# Patient Record
Sex: FEMALE | Race: WHITE | NOT HISPANIC OR LATINO | URBAN - METROPOLITAN AREA
[De-identification: names, ages, dates, MRNs, and addresses within clinical notes are randomized per-mention and may not be internally consistent; named-entity substitution may affect disease eponyms.]

---

## 2018-12-05 ENCOUNTER — APPOINTMENT (OUTPATIENT)
Dept: WOMENS IMAGING | Facility: HOSPITAL | Age: 48
End: 2018-12-05

## 2018-12-05 PROCEDURE — 77067 SCR MAMMO BI INCL CAD: CPT | Performed by: RADIOLOGY

## 2024-03-29 ENCOUNTER — APPOINTMENT (OUTPATIENT)
Dept: CARDIOLOGY | Facility: HOSPITAL | Age: 54
End: 2024-03-29
Payer: COMMERCIAL

## 2024-03-29 ENCOUNTER — APPOINTMENT (OUTPATIENT)
Dept: GENERAL RADIOLOGY | Facility: HOSPITAL | Age: 54
End: 2024-03-29
Payer: COMMERCIAL

## 2024-03-29 ENCOUNTER — HOSPITAL ENCOUNTER (OUTPATIENT)
Facility: HOSPITAL | Age: 54
Setting detail: OBSERVATION
Discharge: HOME | End: 2024-03-29
Attending: EMERGENCY MEDICINE | Admitting: HOSPITALIST
Payer: COMMERCIAL

## 2024-03-29 VITALS
RESPIRATION RATE: 17 BRPM | DIASTOLIC BLOOD PRESSURE: 71 MMHG | HEIGHT: 60 IN | HEART RATE: 64 BPM | OXYGEN SATURATION: 98 % | TEMPERATURE: 98.6 F | WEIGHT: 160 LBS | BODY MASS INDEX: 31.41 KG/M2 | SYSTOLIC BLOOD PRESSURE: 103 MMHG

## 2024-03-29 DIAGNOSIS — I47.10 SVT (SUPRAVENTRICULAR TACHYCARDIA): Primary | ICD-10-CM

## 2024-03-29 DIAGNOSIS — R79.89 ELEVATED TROPONIN: ICD-10-CM

## 2024-03-29 DIAGNOSIS — R07.9 CHEST PAIN, UNSPECIFIED TYPE: ICD-10-CM

## 2024-03-29 PROBLEM — I24.89 DEMAND ISCHEMIA: Status: ACTIVE | Noted: 2024-03-29

## 2024-03-29 PROBLEM — L97.509: Status: ACTIVE | Noted: 2024-03-29

## 2024-03-29 LAB
ALBUMIN SERPL-MCNC: 3.9 G/DL (ref 3.5–5.2)
ALBUMIN/GLOB SERPL: 1.7 G/DL
ALP SERPL-CCNC: 80 U/L (ref 39–117)
ALT SERPL W P-5'-P-CCNC: 38 U/L (ref 1–33)
ANION GAP SERPL CALCULATED.3IONS-SCNC: 11.8 MMOL/L (ref 5–15)
ANION GAP SERPL CALCULATED.3IONS-SCNC: 12 MMOL/L (ref 5–15)
AORTIC ARCH: 2.5 CM
AORTIC DIMENSIONLESS INDEX: 0.6 (DI)
ASCENDING AORTA: 2.8 CM
AST SERPL-CCNC: 40 U/L (ref 1–32)
BASOPHILS # BLD AUTO: 0.03 10*3/MM3 (ref 0–0.2)
BASOPHILS NFR BLD AUTO: 0.5 % (ref 0–1.5)
BH CV ECHO MEAS - ACS: 1.95 CM
BH CV ECHO MEAS - AO MAX PG: 5.3 MMHG
BH CV ECHO MEAS - AO MEAN PG: 3.1 MMHG
BH CV ECHO MEAS - AO ROOT DIAM: 2.7 CM
BH CV ECHO MEAS - AO V2 MAX: 115.4 CM/SEC
BH CV ECHO MEAS - AO V2 VTI: 25.9 CM
BH CV ECHO MEAS - AVA(I,D): 1.65 CM2
BH CV ECHO MEAS - EDV(CUBED): 76.8 ML
BH CV ECHO MEAS - EDV(MOD-SP2): 60 ML
BH CV ECHO MEAS - EDV(MOD-SP4): 58 ML
BH CV ECHO MEAS - EF(MOD-BP): 57.3 %
BH CV ECHO MEAS - EF(MOD-SP2): 58.3 %
BH CV ECHO MEAS - EF(MOD-SP4): 56.9 %
BH CV ECHO MEAS - ESV(CUBED): 26.5 ML
BH CV ECHO MEAS - ESV(MOD-SP2): 25 ML
BH CV ECHO MEAS - ESV(MOD-SP4): 25 ML
BH CV ECHO MEAS - FS: 29.9 %
BH CV ECHO MEAS - IVS/LVPW: 0.96 CM
BH CV ECHO MEAS - IVSD: 0.91 CM
BH CV ECHO MEAS - LAT PEAK E' VEL: 13.1 CM/SEC
BH CV ECHO MEAS - LV MASS(C)D: 126.2 GRAMS
BH CV ECHO MEAS - LV MAX PG: 2.9 MMHG
BH CV ECHO MEAS - LV MEAN PG: 1.53 MMHG
BH CV ECHO MEAS - LV V1 MAX: 85.1 CM/SEC
BH CV ECHO MEAS - LV V1 VTI: 16.9 CM
BH CV ECHO MEAS - LVIDD: 4.3 CM
BH CV ECHO MEAS - LVIDS: 3 CM
BH CV ECHO MEAS - LVOT AREA: 2.5 CM2
BH CV ECHO MEAS - LVOT DIAM: 1.79 CM
BH CV ECHO MEAS - LVPWD: 0.94 CM
BH CV ECHO MEAS - MED PEAK E' VEL: 9.2 CM/SEC
BH CV ECHO MEAS - MV A DUR: 0.1 SEC
BH CV ECHO MEAS - MV A MAX VEL: 37.9 CM/SEC
BH CV ECHO MEAS - MV DEC SLOPE: 257 CM/SEC2
BH CV ECHO MEAS - MV DEC TIME: 0.19 SEC
BH CV ECHO MEAS - MV E MAX VEL: 83.9 CM/SEC
BH CV ECHO MEAS - MV E/A: 2.21
BH CV ECHO MEAS - MV MAX PG: 2.23 MMHG
BH CV ECHO MEAS - MV MEAN PG: 1.07 MMHG
BH CV ECHO MEAS - MV P1/2T: 90.5 MSEC
BH CV ECHO MEAS - MV V2 VTI: 24.3 CM
BH CV ECHO MEAS - MVA(P1/2T): 2.43 CM2
BH CV ECHO MEAS - MVA(VTI): 1.75 CM2
BH CV ECHO MEAS - PA ACC TIME: 0.12 SEC
BH CV ECHO MEAS - PA V2 MAX: 80.8 CM/SEC
BH CV ECHO MEAS - PULM A REVS DUR: 0.09 SEC
BH CV ECHO MEAS - PULM A REVS VEL: 27.3 CM/SEC
BH CV ECHO MEAS - PULM DIAS VEL: 44.3 CM/SEC
BH CV ECHO MEAS - PULM S/D: 0.89
BH CV ECHO MEAS - PULM SYS VEL: 39.4 CM/SEC
BH CV ECHO MEAS - RAP SYSTOLE: 3 MMHG
BH CV ECHO MEAS - RV MAX PG: 1.13 MMHG
BH CV ECHO MEAS - RV V1 MAX: 53.3 CM/SEC
BH CV ECHO MEAS - RV V1 VTI: 14.5 CM
BH CV ECHO MEAS - RVSP: 19.5 MMHG
BH CV ECHO MEAS - SUP REN AO DIAM: 2.1 CM
BH CV ECHO MEAS - SV(LVOT): 42.6 ML
BH CV ECHO MEAS - SV(MOD-SP2): 35 ML
BH CV ECHO MEAS - SV(MOD-SP4): 33 ML
BH CV ECHO MEAS - TAPSE (>1.6): 1.59 CM
BH CV ECHO MEAS - TR MAX PG: 16.5 MMHG
BH CV ECHO MEAS - TR MAX VEL: 203.1 CM/SEC
BH CV ECHO MEASUREMENTS AVERAGE E/E' RATIO: 7.52
BH CV XLRA - RV BASE: 3.8 CM
BH CV XLRA - RV LENGTH: 5.6 CM
BH CV XLRA - RV MID: 1.46 CM
BH CV XLRA - TDI S': 10.9 CM/SEC
BILIRUB SERPL-MCNC: 0.3 MG/DL (ref 0–1.2)
BUN SERPL-MCNC: 12 MG/DL (ref 6–20)
BUN SERPL-MCNC: 13 MG/DL (ref 6–20)
BUN/CREAT SERPL: 14.4 (ref 7–25)
BUN/CREAT SERPL: 15 (ref 7–25)
CALCIUM SPEC-SCNC: 8.1 MG/DL (ref 8.6–10.5)
CALCIUM SPEC-SCNC: 8.4 MG/DL (ref 8.6–10.5)
CHLORIDE SERPL-SCNC: 107 MMOL/L (ref 98–107)
CHLORIDE SERPL-SCNC: 112 MMOL/L (ref 98–107)
CO2 SERPL-SCNC: 21 MMOL/L (ref 22–29)
CO2 SERPL-SCNC: 22.2 MMOL/L (ref 22–29)
CREAT SERPL-MCNC: 0.8 MG/DL (ref 0.57–1)
CREAT SERPL-MCNC: 0.9 MG/DL (ref 0.57–1)
D DIMER PPP FEU-MCNC: <0.27 MCGFEU/ML (ref 0–0.53)
DEPRECATED RDW RBC AUTO: 42.4 FL (ref 37–54)
DEPRECATED RDW RBC AUTO: 44.8 FL (ref 37–54)
EGFRCR SERPLBLD CKD-EPI 2021: 76.6 ML/MIN/1.73
EGFRCR SERPLBLD CKD-EPI 2021: 88.2 ML/MIN/1.73
EOSINOPHIL # BLD AUTO: 0.14 10*3/MM3 (ref 0–0.4)
EOSINOPHIL NFR BLD AUTO: 2.2 % (ref 0.3–6.2)
ERYTHROCYTE [DISTWIDTH] IN BLOOD BY AUTOMATED COUNT: 12.3 % (ref 12.3–15.4)
ERYTHROCYTE [DISTWIDTH] IN BLOOD BY AUTOMATED COUNT: 12.6 % (ref 12.3–15.4)
GEN 5 2HR TROPONIN T REFLEX: 65 NG/L
GLOBULIN UR ELPH-MCNC: 2.3 GM/DL
GLUCOSE SERPL-MCNC: 134 MG/DL (ref 65–99)
GLUCOSE SERPL-MCNC: 91 MG/DL (ref 65–99)
HCT VFR BLD AUTO: 40.1 % (ref 34–46.6)
HCT VFR BLD AUTO: 40.2 % (ref 34–46.6)
HGB BLD-MCNC: 13.2 G/DL (ref 12–15.9)
HGB BLD-MCNC: 13.4 G/DL (ref 12–15.9)
IMM GRANULOCYTES # BLD AUTO: 0.01 10*3/MM3 (ref 0–0.05)
IMM GRANULOCYTES NFR BLD AUTO: 0.2 % (ref 0–0.5)
LEFT ATRIUM VOLUME INDEX: 18.5 ML/M2
LYMPHOCYTES # BLD AUTO: 2.9 10*3/MM3 (ref 0.7–3.1)
LYMPHOCYTES NFR BLD AUTO: 45.4 % (ref 19.6–45.3)
MAGNESIUM SERPL-MCNC: 1.9 MG/DL (ref 1.6–2.6)
MCH RBC QN AUTO: 31.6 PG (ref 26.6–33)
MCH RBC QN AUTO: 31.7 PG (ref 26.6–33)
MCHC RBC AUTO-ENTMCNC: 32.8 G/DL (ref 31.5–35.7)
MCHC RBC AUTO-ENTMCNC: 33.4 G/DL (ref 31.5–35.7)
MCV RBC AUTO: 94.8 FL (ref 79–97)
MCV RBC AUTO: 96.2 FL (ref 79–97)
MONOCYTES # BLD AUTO: 0.3 10*3/MM3 (ref 0.1–0.9)
MONOCYTES NFR BLD AUTO: 4.7 % (ref 5–12)
NEUTROPHILS NFR BLD AUTO: 3.01 10*3/MM3 (ref 1.7–7)
NEUTROPHILS NFR BLD AUTO: 47 % (ref 42.7–76)
NRBC BLD AUTO-RTO: 0 /100 WBC (ref 0–0.2)
PLATELET # BLD AUTO: 207 10*3/MM3 (ref 140–450)
PLATELET # BLD AUTO: 223 10*3/MM3 (ref 140–450)
PMV BLD AUTO: 10.1 FL (ref 6–12)
PMV BLD AUTO: 10.2 FL (ref 6–12)
POTASSIUM SERPL-SCNC: 3.9 MMOL/L (ref 3.5–5.2)
POTASSIUM SERPL-SCNC: 3.9 MMOL/L (ref 3.5–5.2)
PROT SERPL-MCNC: 6.2 G/DL (ref 6–8.5)
QT INTERVAL: 376 MS
QT INTERVAL: 402 MS
QTC INTERVAL: 434 MS
QTC INTERVAL: 461 MS
RBC # BLD AUTO: 4.18 10*6/MM3 (ref 3.77–5.28)
RBC # BLD AUTO: 4.23 10*6/MM3 (ref 3.77–5.28)
SINUS: 2.6 CM
SODIUM SERPL-SCNC: 141 MMOL/L (ref 136–145)
SODIUM SERPL-SCNC: 145 MMOL/L (ref 136–145)
STJ: 2.47 CM
TROPONIN T DELTA: 55 NG/L
TROPONIN T SERPL HS-MCNC: 10 NG/L
TROPONIN T SERPL HS-MCNC: 123 NG/L
TSH SERPL DL<=0.05 MIU/L-ACNC: 2.3 UIU/ML (ref 0.27–4.2)
WBC NRBC COR # BLD AUTO: 6.39 10*3/MM3 (ref 3.4–10.8)
WBC NRBC COR # BLD AUTO: 6.6 10*3/MM3 (ref 3.4–10.8)

## 2024-03-29 PROCEDURE — 99285 EMERGENCY DEPT VISIT HI MDM: CPT

## 2024-03-29 PROCEDURE — 93010 ELECTROCARDIOGRAM REPORT: CPT | Performed by: INTERNAL MEDICINE

## 2024-03-29 PROCEDURE — 85027 COMPLETE CBC AUTOMATED: CPT | Performed by: NURSE PRACTITIONER

## 2024-03-29 PROCEDURE — 85379 FIBRIN DEGRADATION QUANT: CPT | Performed by: EMERGENCY MEDICINE

## 2024-03-29 PROCEDURE — 36415 COLL VENOUS BLD VENIPUNCTURE: CPT | Performed by: NURSE PRACTITIONER

## 2024-03-29 PROCEDURE — 93005 ELECTROCARDIOGRAM TRACING: CPT | Performed by: PHYSICIAN ASSISTANT

## 2024-03-29 PROCEDURE — 93005 ELECTROCARDIOGRAM TRACING: CPT | Performed by: EMERGENCY MEDICINE

## 2024-03-29 PROCEDURE — 80053 COMPREHEN METABOLIC PANEL: CPT | Performed by: EMERGENCY MEDICINE

## 2024-03-29 PROCEDURE — 85025 COMPLETE CBC W/AUTO DIFF WBC: CPT | Performed by: EMERGENCY MEDICINE

## 2024-03-29 PROCEDURE — 71045 X-RAY EXAM CHEST 1 VIEW: CPT

## 2024-03-29 PROCEDURE — G0378 HOSPITAL OBSERVATION PER HR: HCPCS

## 2024-03-29 PROCEDURE — 84484 ASSAY OF TROPONIN QUANT: CPT | Performed by: EMERGENCY MEDICINE

## 2024-03-29 PROCEDURE — 99204 OFFICE O/P NEW MOD 45 MIN: CPT | Performed by: INTERNAL MEDICINE

## 2024-03-29 PROCEDURE — 93306 TTE W/DOPPLER COMPLETE: CPT | Performed by: INTERNAL MEDICINE

## 2024-03-29 PROCEDURE — 84484 ASSAY OF TROPONIN QUANT: CPT | Performed by: NURSE PRACTITIONER

## 2024-03-29 PROCEDURE — 84443 ASSAY THYROID STIM HORMONE: CPT | Performed by: EMERGENCY MEDICINE

## 2024-03-29 PROCEDURE — 83735 ASSAY OF MAGNESIUM: CPT | Performed by: EMERGENCY MEDICINE

## 2024-03-29 PROCEDURE — 93306 TTE W/DOPPLER COMPLETE: CPT

## 2024-03-29 RX ORDER — ACETAMINOPHEN 160 MG/5ML
650 SOLUTION ORAL EVERY 4 HOURS PRN
Status: DISCONTINUED | OUTPATIENT
Start: 2024-03-29 | End: 2024-03-29 | Stop reason: HOSPADM

## 2024-03-29 RX ORDER — AMOXICILLIN 250 MG
2 CAPSULE ORAL 2 TIMES DAILY PRN
Status: DISCONTINUED | OUTPATIENT
Start: 2024-03-29 | End: 2024-03-29 | Stop reason: HOSPADM

## 2024-03-29 RX ORDER — ACETAMINOPHEN 650 MG/1
650 SUPPOSITORY RECTAL EVERY 4 HOURS PRN
Status: DISCONTINUED | OUTPATIENT
Start: 2024-03-29 | End: 2024-03-29 | Stop reason: HOSPADM

## 2024-03-29 RX ORDER — SODIUM CHLORIDE 0.9 % (FLUSH) 0.9 %
10 SYRINGE (ML) INJECTION AS NEEDED
Status: DISCONTINUED | OUTPATIENT
Start: 2024-03-29 | End: 2024-03-29 | Stop reason: HOSPADM

## 2024-03-29 RX ORDER — SODIUM CHLORIDE 9 MG/ML
40 INJECTION, SOLUTION INTRAVENOUS AS NEEDED
Status: DISCONTINUED | OUTPATIENT
Start: 2024-03-29 | End: 2024-03-29 | Stop reason: HOSPADM

## 2024-03-29 RX ORDER — POLYETHYLENE GLYCOL 3350 17 G/17G
17 POWDER, FOR SOLUTION ORAL DAILY PRN
Status: DISCONTINUED | OUTPATIENT
Start: 2024-03-29 | End: 2024-03-29 | Stop reason: HOSPADM

## 2024-03-29 RX ORDER — CALCIUM CARBONATE 500 MG/1
2 TABLET, CHEWABLE ORAL 2 TIMES DAILY PRN
Status: DISCONTINUED | OUTPATIENT
Start: 2024-03-29 | End: 2024-03-29 | Stop reason: HOSPADM

## 2024-03-29 RX ORDER — NITROGLYCERIN 0.4 MG/1
0.4 TABLET SUBLINGUAL
Status: DISCONTINUED | OUTPATIENT
Start: 2024-03-29 | End: 2024-03-29 | Stop reason: HOSPADM

## 2024-03-29 RX ORDER — ACETAMINOPHEN 325 MG/1
650 TABLET ORAL EVERY 4 HOURS PRN
Status: DISCONTINUED | OUTPATIENT
Start: 2024-03-29 | End: 2024-03-29 | Stop reason: HOSPADM

## 2024-03-29 RX ORDER — ATENOLOL 25 MG/1
25 TABLET ORAL
Qty: 30 TABLET | Refills: 1 | Status: SHIPPED | OUTPATIENT
Start: 2024-03-30

## 2024-03-29 RX ORDER — SODIUM CHLORIDE 0.9 % (FLUSH) 0.9 %
10 SYRINGE (ML) INJECTION EVERY 12 HOURS SCHEDULED
Status: DISCONTINUED | OUTPATIENT
Start: 2024-03-29 | End: 2024-03-29 | Stop reason: HOSPADM

## 2024-03-29 RX ORDER — ATENOLOL 25 MG/1
25 TABLET ORAL
Status: DISCONTINUED | OUTPATIENT
Start: 2024-03-29 | End: 2024-03-29 | Stop reason: HOSPADM

## 2024-03-29 RX ORDER — BISACODYL 5 MG/1
5 TABLET, DELAYED RELEASE ORAL DAILY PRN
Status: DISCONTINUED | OUTPATIENT
Start: 2024-03-29 | End: 2024-03-29 | Stop reason: HOSPADM

## 2024-03-29 RX ORDER — BISACODYL 10 MG
10 SUPPOSITORY, RECTAL RECTAL DAILY PRN
Status: DISCONTINUED | OUTPATIENT
Start: 2024-03-29 | End: 2024-03-29 | Stop reason: HOSPADM

## 2024-03-29 RX ADMIN — Medication 10 ML: at 08:57

## 2024-03-29 RX ADMIN — ATENOLOL 25 MG: 25 TABLET ORAL at 10:56

## 2024-03-29 NOTE — H&P
Patient Name:  Eleni Dahl  YOB: 1970  MRN:  [id number]  Admit Date:  3/29/2024  Patient Care Team:  Provider, No Known as PCP - General      Subjective   History Present Illness     Chief Complaint   Patient presents with    Chest Pain    Abdominal Pain       Ms. Dahl is a 54 y.o. female who is generally healthy who presented to the hospital last night with acute onset of chest pain and palpitations.  She had just gone to bed about an hour prior and developed the symptoms suddenly.  She called EMS and was noted to be in SVT with narrow complex tachycardia rates over 260.  She was actually cardioverted en route.  She has not any recurrent symptoms since getting here.  She reports a past history of the same thing occurring about 10 years ago and apparently was cardioverted quickly then as well.  She has not been on any cardiac medications since that time and has not had any known recurrence.  She has otherwise been in her normal state of health recently.  She denies any changes to her diet or excessive caffeine intake.    Review of Systems   Constitutional:  Negative for chills, fatigue and fever.   HENT:  Negative for congestion and trouble swallowing.    Eyes:  Negative for visual disturbance.   Respiratory:  Negative for cough, chest tightness and shortness of breath.    Cardiovascular:  Negative for chest pain, palpitations and leg swelling.   Gastrointestinal:  Negative for abdominal distention, abdominal pain, constipation, diarrhea, nausea and vomiting.   Genitourinary:  Negative for difficulty urinating, dysuria and frequency.   Musculoskeletal:  Negative for arthralgias.   Skin:  Negative for rash.   Neurological:  Negative for dizziness and headaches.   Psychiatric/Behavioral:  Negative for confusion.         Personal History     History reviewed. No pertinent past medical history.  History reviewed. No pertinent surgical history.  History reviewed. No pertinent family  history.  Social History     Tobacco Use    Smoking status: Never    Smokeless tobacco: Never   Vaping Use    Vaping status: Never Used   Substance Use Topics    Alcohol use: Never    Drug use: Never     No current facility-administered medications on file prior to encounter.     No current outpatient medications on file prior to encounter.     Allergies   Allergen Reactions    Wound Dressing Adhesive Rash       Objective    Objective     Vital Signs  Temp:  [98 °F (36.7 °C)-98.6 °F (37 °C)] 98.6 °F (37 °C)  Heart Rate:  [69-83] 69  Resp:  [16-20] 17  BP: (102-128)/(73-83) 119/73  SpO2:  [97 %-100 %] 97 %  on   ;   Device (Oxygen Therapy): room air  Body mass index is 31.25 kg/m².    Physical Exam  Vitals reviewed.   Constitutional:       General: She is not in acute distress.  HENT:      Head: Normocephalic and atraumatic.   Eyes:      General: No scleral icterus.  Neck:      Vascular: No JVD.   Cardiovascular:      Rate and Rhythm: Normal rate and regular rhythm.      Pulses: Normal pulses.      Heart sounds: Normal heart sounds. No murmur heard.  Pulmonary:      Effort: Pulmonary effort is normal. No respiratory distress.      Breath sounds: Normal breath sounds.   Abdominal:      General: There is no distension.      Palpations: Abdomen is soft.      Tenderness: There is no abdominal tenderness.   Musculoskeletal:         General: No swelling or tenderness.      Cervical back: Neck supple.   Skin:     General: Skin is warm and dry.      Coloration: Skin is not jaundiced.      Findings: No rash.   Neurological:      Mental Status: She is alert and oriented to person, place, and time.   Psychiatric:         Mood and Affect: Mood normal.         Behavior: Behavior normal.         Results Review:  I reviewed the patient's new clinical results.  I reviewed the patient's new imaging results and agree with the interpretation.  I reviewed the patient's other test results and agree with the interpretation  I personally  viewed and interpreted the patient's EKG/Telemetry data      Lab Results (last 24 hours)       Procedure Component Value Units Date/Time    CBC & Differential [[id number]]  (Abnormal) Collected: 03/29/24 0124    Specimen: Blood Updated: 03/29/24 0132    Narrative:      The following orders were created for panel order CBC & Differential.  Procedure                               Abnormality         Status                     ---------                               -----------         ------                     CBC Auto Differential[[id number]]        Abnormal            Final result                 Please view results for these tests on the individual orders.    Comprehensive Metabolic Panel [[id number]]  (Abnormal) Collected: 03/29/24 0124    Specimen: Blood Updated: 03/29/24 0148     Glucose 134 mg/dL      BUN 13 mg/dL      Creatinine 0.90 mg/dL      Sodium 145 mmol/L      Potassium 3.9 mmol/L      Comment: Specimen hemolyzed.  Result may be falsely elevated.        Chloride 112 mmol/L      CO2 21.0 mmol/L      Calcium 8.1 mg/dL      Total Protein 6.2 g/dL      Albumin 3.9 g/dL      ALT (SGPT) 38 U/L      Comment: Specimen hemolyzed.  Result may  be falsely elevated.        AST (SGOT) 40 U/L      Comment: Specimen hemolyzed.  Result may be falsely elevated.        Alkaline Phosphatase 80 U/L      Total Bilirubin 0.3 mg/dL      Globulin 2.3 gm/dL      A/G Ratio 1.7 g/dL      BUN/Creatinine Ratio 14.4     Anion Gap 12.0 mmol/L      eGFR 76.6 mL/min/1.73     Narrative:      GFR Normal >60  Chronic Kidney Disease <60  Kidney Failure <15      High Sensitivity Troponin T [[id number]]  (Normal) Collected: 03/29/24 0124    Specimen: Blood Updated: 03/29/24 0154     HS Troponin T 10 ng/L     Narrative:      High Sensitive Troponin T Reference Range:  <14.0 ng/L- Negative Female for AMI  <22.0 ng/L- Negative Male for AMI  >=14 - Abnormal Female indicating possible myocardial injury.  >=22 - Abnormal Male indicating possible  myocardial injury.   Clinicians would have to utilize clinical acumen, EKG, Troponin, and serial changes to determine if it is an Acute Myocardial Infarction or myocardial injury due to an underlying chronic condition.         TSH [[id number]]  (Normal) Collected: 03/29/24 0124    Specimen: Blood Updated: 03/29/24 0154     TSH 2.300 uIU/mL     Magnesium [[id number]]  (Normal) Collected: 03/29/24 0124    Specimen: Blood Updated: 03/29/24 0148     Magnesium 1.9 mg/dL     CBC Auto Differential [[id number]]  (Abnormal) Collected: 03/29/24 0124    Specimen: Blood Updated: 03/29/24 0132     WBC 6.39 10*3/mm3      RBC 4.18 10*6/mm3      Hemoglobin 13.2 g/dL      Hematocrit 40.2 %      MCV 96.2 fL      MCH 31.6 pg      MCHC 32.8 g/dL      RDW 12.6 %      RDW-SD 44.8 fl      MPV 10.1 fL      Platelets 207 10*3/mm3      Neutrophil % 47.0 %      Lymphocyte % 45.4 %      Monocyte % 4.7 %      Eosinophil % 2.2 %      Basophil % 0.5 %      Immature Grans % 0.2 %      Neutrophils, Absolute 3.01 10*3/mm3      Lymphocytes, Absolute 2.90 10*3/mm3      Monocytes, Absolute 0.30 10*3/mm3      Eosinophils, Absolute 0.14 10*3/mm3      Basophils, Absolute 0.03 10*3/mm3      Immature Grans, Absolute 0.01 10*3/mm3      nRBC 0.0 /100 WBC     D-dimer, Quantitative [[id number]]  (Normal) Collected: 03/29/24 0141    Specimen: Blood Updated: 03/29/24 0223     D-Dimer, Quantitative <0.27 MCGFEU/mL     Narrative:      According to the assay 's published package insert, a normal (<0.50 MCGFEU/mL) D-dimer result in conjunction with a non-high clinical probability assessment, excludes deep vein thrombosis (DVT) and pulmonary embolism (PE) with high sensitivity.    D-dimer values increase with age and this can make VTE exclusion of an older population difficult. To address this, the [employer], based on best available evidence and recent guidelines, recommends that clinicians use age-adjusted D-dimer thresholds in patients  "greater than 50 years of age with: a) a low probability of PE who do not meet all Pulmonary Embolism Rule Out Criteria, or b) in those with intermediate probability of PE.   The formula for an age-adjusted D-dimer cut-off is \"age/100\".  For example, a 60 year old patient would have an age-adjusted cut-off of 0.60 MCGFEU/mL and an 80 year old 0.80 MCGFEU/mL.    High Sensitivity Troponin T 2Hr [[id number]]  (Abnormal) Collected: 03/29/24 0320    Specimen: Blood Updated: 03/29/24 0430     HS Troponin T 65 ng/L      Troponin T Delta 55 ng/L     Narrative:      High Sensitive Troponin T Reference Range:  <14.0 ng/L- Negative Female for AMI  <22.0 ng/L- Negative Male for AMI  >=14 - Abnormal Female indicating possible myocardial injury.  >=22 - Abnormal Male indicating possible myocardial injury.   Clinicians would have to utilize clinical acumen, EKG, Troponin, and serial changes to determine if it is an Acute Myocardial Infarction or myocardial injury due to an underlying chronic condition.         Basic Metabolic Panel [[id number]]  (Abnormal) Collected: 03/29/24 0707    Specimen: Blood Updated: 03/29/24 0858     Glucose 91 mg/dL      BUN 12 mg/dL      Creatinine 0.80 mg/dL      Sodium 141 mmol/L      Potassium 3.9 mmol/L      Chloride 107 mmol/L      CO2 22.2 mmol/L      Calcium 8.4 mg/dL      BUN/Creatinine Ratio 15.0     Anion Gap 11.8 mmol/L      eGFR 88.2 mL/min/1.73     Narrative:      GFR Normal >60  Chronic Kidney Disease <60  Kidney Failure <15      CBC (No Diff) [[id number]]  (Normal) Collected: 03/29/24 0707    Specimen: Blood Updated: 03/29/24 0824     WBC 6.60 10*3/mm3      RBC 4.23 10*6/mm3      Hemoglobin 13.4 g/dL      Hematocrit 40.1 %      MCV 94.8 fL      MCH 31.7 pg      MCHC 33.4 g/dL      RDW 12.3 %      RDW-SD 42.4 fl      MPV 10.2 fL      Platelets 223 10*3/mm3     High Sensitivity Troponin T [[id number]]  (Abnormal) Collected: 03/29/24 0707    Specimen: Blood Updated: 03/29/24 0901     HS Troponin " T 123 ng/L     Narrative:      High Sensitive Troponin T Reference Range:  <14.0 ng/L- Negative Female for AMI  <22.0 ng/L- Negative Male for AMI  >=14 - Abnormal Female indicating possible myocardial injury.  >=22 - Abnormal Male indicating possible myocardial injury.   Clinicians would have to utilize clinical acumen, EKG, Troponin, and serial changes to determine if it is an Acute Myocardial Infarction or myocardial injury due to an underlying chronic condition.                 Imaging Results (Last 24 Hours)       Procedure Component Value Units Date/Time    XR Chest 1 View [[id number]] Collected: 03/29/24 0336     Updated: 03/29/24 0336    Narrative:        Patient: BRIDGETT SHAH  Time Out: 03:36  Exam(s): XR CXR 1 VIEW     EXAM:    XR Chest, 1 View    CLINICAL HISTORY:     Reason for exam: chest pain.    TECHNIQUE:    Frontal view of the chest.    COMPARISON:    No relevant prior studies available.    FINDINGS:    Lungs:  Unremarkable.  No consolidation.    Pleural space:  Unremarkable.  No pneumothorax.    Heart:  Unremarkable.  No cardiomegaly.    Mediastinum:  Unremarkable.  Normal mediastinal contour.    Bones joints:  Unremarkable.  No acute fracture.    IMPRESSION:         Normal chest x-ray.      Impression:          Electronically signed by Tyrell Singh MD on 03-29-24 at 0336                ECG 12 Lead Chest Pain   Preliminary Result   HEART RATE= 80  bpm   RR Interval= 750  ms   UT Interval= 137  ms   P Horizontal Axis= 18  deg   P Front Axis= 18  deg   QRSD Interval= 87  ms   QT Interval= 376  ms   QTcB= 434  ms   QRS Axis= 13  deg   T Wave Axis= 12  deg   - OTHERWISE NORMAL ECG -   Sinus rhythm   Borderline low voltage, extremity leads   Electronically Signed By:    Date and Time of Study: 2024-03-29 04:42:19      ECG 12 Lead Chest Pain   Preliminary Result   HEART RATE= 79  bpm   RR Interval= 759  ms   UT Interval= 138  ms   P Horizontal Axis= 20  deg   P Front Axis= 28  deg   QRSD Interval= 94  ms    QT Interval= 402  ms   QTcB= 461  ms   QRS Axis= 36  deg   T Wave Axis= 31  deg   - BORDERLINE ECG -   Sinus rhythm   Borderline ST depression, anterolateral leads   Electronically Signed By:    Date and Time of Study: 2024-03-29 01:21:17           Assessment/Plan     Active Hospital Problems    Diagnosis  POA    **Supraventricular tachycardia [I47.10]  Yes    Troponin level elevated [R79.89]  Yes    Demand ischemia [I24.89]  Yes      Resolved Hospital Problems   No resolved problems to display.       Ms. Dahl is a 54 y.o. female who is generally healthy who presents with acute onset of chest pain and palpitations with findings of SVT now status post cardioversion en route to the hospital    No recurrent SVT thus far.  Discussed with Dr. Hoang who is starting atenolol and is going to review with EP.  D-dimer is normal and TSH normal as well, no electrolyte abnormalities.  Troponin is elevated but I suspect this is demand ischemia.  She has no ongoing symptoms.  Echocardiogram ordered, will evaluate cardiac function.  Defer to cardiology if any further testing is needed  Potential dc later today pending cardiology plans    VTE Prophylaxis - SCDs.  Code Status - Full code.       Elmo Sanchez MD  [facility]  03/29/24  13:06 EDT

## 2024-03-29 NOTE — DISCHARGE INSTRUCTIONS
Follow-up with your primary care doctor for further evaluation.  The cardiologist office will call you to schedule appointment to be seen.  Return immediately for any further symptoms of palpitations, chest pain, or shortness of breath.

## 2024-03-29 NOTE — ED NOTES
.Nursing report ED to floor  Eleni Dahl  54 y.o.  female    HPI :  HPI (Adult)  Stated Reason for Visit: chest pain and abd pain that woke her up out of her sleep. EMS monitors showed vtach  History Obtained From: patient, EMS    Chief Complaint  Chief Complaint   Patient presents with    Chest Pain    Abdominal Pain       Admitting doctor:   Yaniv Desir MD    Admitting diagnosis:   The primary encounter diagnosis was SVT (supraventricular tachycardia). Diagnoses of Chest pain, unspecified type and Elevated troponin were also pertinent to this visit.    Code status:   Current Code Status       Date Active Code Status Order ID Comments User Context       3/29/2024 0533 CPR (Attempt to Resuscitate) [id number]  Pascale Garcias APRN ED        Question Answer    Code Status (Patient has no pulse and is not breathing) CPR (Attempt to Resuscitate)    Medical Interventions (Patient has pulse or is breathing) Full Support                    Allergies:   Wound dressing adhesive    Isolation:   No active isolations    Intake and Output    Intake/Output Summary (Last 24 hours) at 3/29/2024 0545  Last data filed at 3/29/2024 0112  Gross per 24 hour   Intake 500 ml   Output --   Net 500 ml       Weight:       03/29/24  0114   Weight: 72.6 kg (160 lb)       Most recent vitals:   Vitals:    03/29/24 0401 03/29/24 0404 03/29/24 0501 03/29/24 0531   BP: 102/73  114/82 115/75   Pulse:  71 71 72   Resp:  16 16 16   Temp:       TempSrc:       SpO2:  98% 97% 99%   Weight:       Height:           Active LDAs/IV Access:   Lines, Drains & Airways       Active LDAs       Name Placement date Placement time Site Days    Peripheral IV 03/29/24 0112 Left Antecubital 03/29/24  0112  Antecubital  less than 1    External Urinary Catheter 03/29/24  0154  --  less than 1                    Labs (abnormal labs have a star):   Labs Reviewed   COMPREHENSIVE METABOLIC PANEL - Abnormal; Notable for the following components:       Result Value     Glucose 134 (*)     Chloride 112 (*)     CO2 21.0 (*)     Calcium 8.1 (*)     ALT (SGPT) 38 (*)     AST (SGOT) 40 (*)     All other components within normal limits    Narrative:     GFR Normal >60  Chronic Kidney Disease <60  Kidney Failure <15     CBC WITH AUTO DIFFERENTIAL - Abnormal; Notable for the following components:    Lymphocyte % 45.4 (*)     Monocyte % 4.7 (*)     All other components within normal limits   HIGH SENSITIVITIY TROPONIN T 2HR - Abnormal; Notable for the following components:    HS Troponin T 65 (*)     Troponin T Delta 55 (*)     All other components within normal limits    Narrative:     High Sensitive Troponin T Reference Range:  <14.0 ng/L- Negative Female for AMI  <22.0 ng/L- Negative Male for AMI  >=14 - Abnormal Female indicating possible myocardial injury.  >=22 - Abnormal Male indicating possible myocardial injury.   Clinicians would have to utilize clinical acumen, EKG, Troponin, and serial changes to determine if it is an Acute Myocardial Infarction or myocardial injury due to an underlying chronic condition.        TROPONIN - Normal    Narrative:     High Sensitive Troponin T Reference Range:  <14.0 ng/L- Negative Female for AMI  <22.0 ng/L- Negative Male for AMI  >=14 - Abnormal Female indicating possible myocardial injury.  >=22 - Abnormal Male indicating possible myocardial injury.   Clinicians would have to utilize clinical acumen, EKG, Troponin, and serial changes to determine if it is an Acute Myocardial Infarction or myocardial injury due to an underlying chronic condition.        TSH - Normal   MAGNESIUM - Normal   D-DIMER, QUANTITATIVE - Normal    Narrative:     According to the assay 's published package insert, a normal (<0.50 MCGFEU/mL) D-dimer result in conjunction with a non-high clinical probability assessment, excludes deep vein thrombosis (DVT) and pulmonary embolism (PE) with high sensitivity.    D-dimer values increase with age and this can make  "VTE exclusion of an older population difficult. To address this, the [employer], based on best available evidence and recent guidelines, recommends that clinicians use age-adjusted D-dimer thresholds in patients greater than 50 years of age with: a) a low probability of PE who do not meet all Pulmonary Embolism Rule Out Criteria, or b) in those with intermediate probability of PE.   The formula for an age-adjusted D-dimer cut-off is \"age/100\".  For example, a 60 year old patient would have an age-adjusted cut-off of 0.60 MCGFEU/mL and an 80 year old 0.80 MCGFEU/mL.   BASIC METABOLIC PANEL   CBC (NO DIFF)   TROPONIN   CBC AND DIFFERENTIAL    Narrative:     The following orders were created for panel order CBC & Differential.  Procedure                               Abnormality         Status                     ---------                               -----------         ------                     CBC Auto Differential[[id number]]        Abnormal            Final result                 Please view results for these tests on the individual orders.       EKG:   ECG 12 Lead Chest Pain   Preliminary Result   HEART RATE= 80  bpm   RR Interval= 750  ms   MD Interval= 137  ms   P Horizontal Axis= 18  deg   P Front Axis= 18  deg   QRSD Interval= 87  ms   QT Interval= 376  ms   QTcB= 434  ms   QRS Axis= 13  deg   T Wave Axis= 12  deg   - OTHERWISE NORMAL ECG -   Sinus rhythm   Borderline low voltage, extremity leads   Electronically Signed By:    Date and Time of Study: 2024-03-29 04:42:19      ECG 12 Lead Chest Pain   Preliminary Result   HEART RATE= 79  bpm   RR Interval= 759  ms   MD Interval= 138  ms   P Horizontal Axis= 20  deg   P Front Axis= 28  deg   QRSD Interval= 94  ms   QT Interval= 402  ms   QTcB= 461  ms   QRS Axis= 36  deg   T Wave Axis= 31  deg   - BORDERLINE ECG -   Sinus rhythm   Borderline ST depression, anterolateral leads   Electronically Signed By:    Date and Time of Study: 2024-03-29 " 01:21:17          Meds given in ED:   Medications   sodium chloride 0.9 % flush 10 mL (has no administration in time range)   sodium chloride 0.9 % flush 10 mL (has no administration in time range)   sodium chloride 0.9 % flush 10 mL (has no administration in time range)   sodium chloride 0.9 % infusion 40 mL (has no administration in time range)   nitroglycerin (NITROSTAT) SL tablet 0.4 mg (has no administration in time range)   acetaminophen (TYLENOL) tablet 650 mg (has no administration in time range)     Or   acetaminophen (TYLENOL) 160 MG/5ML oral solution 650 mg (has no administration in time range)     Or   acetaminophen (TYLENOL) suppository 650 mg (has no administration in time range)   sennosides-docusate (PERICOLACE) 8.6-50 MG per tablet 2 tablet (has no administration in time range)     And   polyethylene glycol (MIRALAX) packet 17 g (has no administration in time range)     And   bisacodyl (DULCOLAX) EC tablet 5 mg (has no administration in time range)     And   bisacodyl (DULCOLAX) suppository 10 mg (has no administration in time range)   calcium carbonate (TUMS) chewable tablet 500 mg (200 mg elemental) (has no administration in time range)       Imaging results:  XR Chest 1 View    Result Date: 3/29/2024  Electronically signed by Tyrell Singh MD on 03-29-24 at 0336     Ambulatory status:   - up ad queta    Social issues:   Social History     Socioeconomic History    Marital status:        Peripheral Neurovascular  Peripheral Neurovascular (Adult)  Peripheral Neurovascular WDL: WDL    Neuro Cognitive  Neuro Cognitive (Adult)  Cognitive/Neuro/Behavioral WDL: WDL, level of consciousness, orientation  Level of Consciousness: Alert  Orientation: oriented x 4    Learning  Learning Assessment (Adult)  Learning Readiness and Ability: no barriers identified    Respiratory  Respiratory WDL  Respiratory WDL: WDL    Abdominal Pain       Pain Assessments  Pain (Adult)  (0-10) Pain Rating: Rest: 1  (0-10) Pain  Rating: Activity: 1    NIH Stroke Scale       Dannielle Gay, RN  03/29/24 05:45 EDT

## 2024-03-29 NOTE — PAYOR COMM NOTE
"Bridgett Shah (54 y.o. Female)     PLEASE SEE ATTACHED FOR OBSERVATION AUTH          PLEASE CALL JORGE A @ [phone] OR [phone]    THANK YOU   JORGE A GARCIA RN/ DEPT  [facility]       Date of Birth   1970    Social Security Number       Address   [address] [place], [place] [address]    Home Phone   [phone]    MRN   [id number]       Mor[employer]   Pentecostal    Marital Status                               Admission Date   3/29/24    Admission Type   Emergency    Admitting Provider   Elmo Sanchez MD    Attending Provider   Elmo Sanchez MD    Department, Room/Bed   [facility], S501/1       Discharge Date       Discharge Disposition       Discharge Destination                                 Attending Provider: Elmo Sanchez MD    Allergies: Wound Dressing Adhesive    Isolation: None   Infection: None   Code Status: CPR    Ht: 152.4 cm (60\")   Wt: 72.6 kg (160 lb)    Admission Cmt: None   Principal Problem: Supraventricular tachycardia [I47.10]                   Active Insurance as of 3/29/2024       Primary Coverage       Payor Plan Insurance Group Employer/Plan Group    [employer] [place] [employer] [id number]       Payor Plan Address Payor Plan Phone Number Payor Plan Fax Number Effective Dates    [place] [address] [phone]  1/1/2024 - None Entered    [facility] [id number]         Subscriber Name Subscriber Birth Date Member ID       BRIDGETT SHAH 1970 [id number]                     Emergency Contacts        (Rel.) Home Phone Work Phone Mobile Phone    KIP SHAH (Spouse) [phone] -- --              [place]: [facility] [id number]  Tax ID [id number]     History & Physical        Elmo Sanchez MD at 03/29/24 1305              Patient Name:  Bridgett Shah  YOB: 1970  MRN:  [id number]  Admit Date:  3/29/2024  Patient Care " Team:  Provider, No Known as PCP - General      Subjective  History Present Illness     Chief Complaint   Patient presents with    Chest Pain    Abdominal Pain       Ms. Dahl is a 54 y.o. female who is generally healthy who presented to the hospital last night with acute onset of chest pain and palpitations.  She had just gone to bed about an hour prior and developed the symptoms suddenly.  She called EMS and was noted to be in SVT with narrow complex tachycardia rates over 260.  She was actually cardioverted en route.  She has not any recurrent symptoms since getting here.  She reports a past history of the same thing occurring about 10 years ago and apparently was cardioverted quickly then as well.  She has not been on any cardiac medications since that time and has not had any known recurrence.  She has otherwise been in her normal state of health recently.  She denies any changes to her diet or excessive caffeine intake.    Review of Systems   Constitutional:  Negative for chills, fatigue and fever.   HENT:  Negative for congestion and trouble swallowing.    Eyes:  Negative for visual disturbance.   Respiratory:  Negative for cough, chest tightness and shortness of breath.    Cardiovascular:  Negative for chest pain, palpitations and leg swelling.   Gastrointestinal:  Negative for abdominal distention, abdominal pain, constipation, diarrhea, nausea and vomiting.   Genitourinary:  Negative for difficulty urinating, dysuria and frequency.   Musculoskeletal:  Negative for arthralgias.   Skin:  Negative for rash.   Neurological:  Negative for dizziness and headaches.   Psychiatric/Behavioral:  Negative for confusion.         Personal History     History reviewed. No pertinent past medical history.  History reviewed. No pertinent surgical history.  History reviewed. No pertinent family history.  Social History     Tobacco Use    Smoking status: Never    Smokeless tobacco: Never   Vaping Use    Vaping status:  Never Used   Substance Use Topics    Alcohol use: Never    Drug use: Never     No current facility-administered medications on file prior to encounter.     No current outpatient medications on file prior to encounter.     Allergies   Allergen Reactions    Wound Dressing Adhesive Rash       Objective   Objective     Vital Signs  Temp:  [98 °F (36.7 °C)-98.6 °F (37 °C)] 98.6 °F (37 °C)  Heart Rate:  [69-83] 69  Resp:  [16-20] 17  BP: (102-128)/(73-83) 119/73  SpO2:  [97 %-100 %] 97 %  on   ;   Device (Oxygen Therapy): room air  Body mass index is 31.25 kg/m².    Physical Exam  Vitals reviewed.   Constitutional:       General: She is not in acute distress.  HENT:      Head: Normocephalic and atraumatic.   Eyes:      General: No scleral icterus.  Neck:      Vascular: No JVD.   Cardiovascular:      Rate and Rhythm: Normal rate and regular rhythm.      Pulses: Normal pulses.      Heart sounds: Normal heart sounds. No murmur heard.  Pulmonary:      Effort: Pulmonary effort is normal. No respiratory distress.      Breath sounds: Normal breath sounds.   Abdominal:      General: There is no distension.      Palpations: Abdomen is soft.      Tenderness: There is no abdominal tenderness.   Musculoskeletal:         General: No swelling or tenderness.      Cervical back: Neck supple.   Skin:     General: Skin is warm and dry.      Coloration: Skin is not jaundiced.      Findings: No rash.   Neurological:      Mental Status: She is alert and oriented to person, place, and time.   Psychiatric:         Mood and Affect: Mood normal.         Behavior: Behavior normal.         Results Review:  I reviewed the patient's new clinical results.  I reviewed the patient's new imaging results and agree with the interpretation.  I reviewed the patient's other test results and agree with the interpretation  I personally viewed and interpreted the patient's EKG/Telemetry data      Lab Results (last 24 hours)       Procedure Component Value Units  Date/Time    CBC & Differential [[id number]]  (Abnormal) Collected: 03/29/24 0124    Specimen: Blood Updated: 03/29/24 0132    Narrative:      The following orders were created for panel order CBC & Differential.  Procedure                               Abnormality         Status                     ---------                               -----------         ------                     CBC Auto Differential[[id number]]        Abnormal            Final result                 Please view results for these tests on the individual orders.    Comprehensive Metabolic Panel [[id number]]  (Abnormal) Collected: 03/29/24 0124    Specimen: Blood Updated: 03/29/24 0148     Glucose 134 mg/dL      BUN 13 mg/dL      Creatinine 0.90 mg/dL      Sodium 145 mmol/L      Potassium 3.9 mmol/L      Comment: Specimen hemolyzed.  Result may be falsely elevated.        Chloride 112 mmol/L      CO2 21.0 mmol/L      Calcium 8.1 mg/dL      Total Protein 6.2 g/dL      Albumin 3.9 g/dL      ALT (SGPT) 38 U/L      Comment: Specimen hemolyzed.  Result may  be falsely elevated.        AST (SGOT) 40 U/L      Comment: Specimen hemolyzed.  Result may be falsely elevated.        Alkaline Phosphatase 80 U/L      Total Bilirubin 0.3 mg/dL      Globulin 2.3 gm/dL      A/G Ratio 1.7 g/dL      BUN/Creatinine Ratio 14.4     Anion Gap 12.0 mmol/L      eGFR 76.6 mL/min/1.73     Narrative:      GFR Normal >60  Chronic Kidney Disease <60  Kidney Failure <15      High Sensitivity Troponin T [[id number]]  (Normal) Collected: 03/29/24 0124    Specimen: Blood Updated: 03/29/24 0154     HS Troponin T 10 ng/L     Narrative:      High Sensitive Troponin T Reference Range:  <14.0 ng/L- Negative Female for AMI  <22.0 ng/L- Negative Male for AMI  >=14 - Abnormal Female indicating possible myocardial injury.  >=22 - Abnormal Male indicating possible myocardial injury.   Clinicians would have to utilize clinical acumen, EKG, Troponin, and serial changes to determine if it is an  Acute Myocardial Infarction or myocardial injury due to an underlying chronic condition.         TSH [[id number]]  (Normal) Collected: 03/29/24 0124    Specimen: Blood Updated: 03/29/24 0154     TSH 2.300 uIU/mL     Magnesium [[id number]]  (Normal) Collected: 03/29/24 0124    Specimen: Blood Updated: 03/29/24 0148     Magnesium 1.9 mg/dL     CBC Auto Differential [[id number]]  (Abnormal) Collected: 03/29/24 0124    Specimen: Blood Updated: 03/29/24 0132     WBC 6.39 10*3/mm3      RBC 4.18 10*6/mm3      Hemoglobin 13.2 g/dL      Hematocrit 40.2 %      MCV 96.2 fL      MCH 31.6 pg      MCHC 32.8 g/dL      RDW 12.6 %      RDW-SD 44.8 fl      MPV 10.1 fL      Platelets 207 10*3/mm3      Neutrophil % 47.0 %      Lymphocyte % 45.4 %      Monocyte % 4.7 %      Eosinophil % 2.2 %      Basophil % 0.5 %      Immature Grans % 0.2 %      Neutrophils, Absolute 3.01 10*3/mm3      Lymphocytes, Absolute 2.90 10*3/mm3      Monocytes, Absolute 0.30 10*3/mm3      Eosinophils, Absolute 0.14 10*3/mm3      Basophils, Absolute 0.03 10*3/mm3      Immature Grans, Absolute 0.01 10*3/mm3      nRBC 0.0 /100 WBC     D-dimer, Quantitative [[id number]]  (Normal) Collected: 03/29/24 0141    Specimen: Blood Updated: 03/29/24 0223     D-Dimer, Quantitative <0.27 MCGFEU/mL     Narrative:      According to the assay 's published package insert, a normal (<0.50 MCGFEU/mL) D-dimer result in conjunction with a non-high clinical probability assessment, excludes deep vein thrombosis (DVT) and pulmonary embolism (PE) with high sensitivity.    D-dimer values increase with age and this can make VTE exclusion of an older population difficult. To address this, the American College of Physicians, based on best available evidence and recent guidelines, recommends that clinicians use age-adjusted D-dimer thresholds in patients greater than 50 years of age with: a) a low probability of PE who do not meet all Pulmonary Embolism Rule Out Criteria, or b) in  "those with intermediate probability of PE.   The formula for an age-adjusted D-dimer cut-off is \"age/100\".  For example, a 60 year old patient would have an age-adjusted cut-off of 0.60 MCGFEU/mL and an 80 year old 0.80 MCGFEU/mL.    High Sensitivity Troponin T 2Hr [[id number]]  (Abnormal) Collected: 03/29/24 0320    Specimen: Blood Updated: 03/29/24 0430     HS Troponin T 65 ng/L      Troponin T Delta 55 ng/L     Narrative:      High Sensitive Troponin T Reference Range:  <14.0 ng/L- Negative Female for AMI  <22.0 ng/L- Negative Male for AMI  >=14 - Abnormal Female indicating possible myocardial injury.  >=22 - Abnormal Male indicating possible myocardial injury.   Clinicians would have to utilize clinical acumen, EKG, Troponin, and serial changes to determine if it is an Acute Myocardial Infarction or myocardial injury due to an underlying chronic condition.         Basic Metabolic Panel [[id number]]  (Abnormal) Collected: 03/29/24 0707    Specimen: Blood Updated: 03/29/24 0858     Glucose 91 mg/dL      BUN 12 mg/dL      Creatinine 0.80 mg/dL      Sodium 141 mmol/L      Potassium 3.9 mmol/L      Chloride 107 mmol/L      CO2 22.2 mmol/L      Calcium 8.4 mg/dL      BUN/Creatinine Ratio 15.0     Anion Gap 11.8 mmol/L      eGFR 88.2 mL/min/1.73     Narrative:      GFR Normal >60  Chronic Kidney Disease <60  Kidney Failure <15      CBC (No Diff) [[id number]]  (Normal) Collected: 03/29/24 0707    Specimen: Blood Updated: 03/29/24 0824     WBC 6.60 10*3/mm3      RBC 4.23 10*6/mm3      Hemoglobin 13.4 g/dL      Hematocrit 40.1 %      MCV 94.8 fL      MCH 31.7 pg      MCHC 33.4 g/dL      RDW 12.3 %      RDW-SD 42.4 fl      MPV 10.2 fL      Platelets 223 10*3/mm3     High Sensitivity Troponin T [[id number]]  (Abnormal) Collected: 03/29/24 0707    Specimen: Blood Updated: 03/29/24 0901     HS Troponin T 123 ng/L     Narrative:      High Sensitive Troponin T Reference Range:  <14.0 ng/L- Negative Female for AMI  <22.0 ng/L- " Negative Male for AMI  >=14 - Abnormal Female indicating possible myocardial injury.  >=22 - Abnormal Male indicating possible myocardial injury.   Clinicians would have to utilize clinical acumen, EKG, Troponin, and serial changes to determine if it is an Acute Myocardial Infarction or myocardial injury due to an underlying chronic condition.                 Imaging Results (Last 24 Hours)       Procedure Component Value Units Date/Time    XR Chest 1 View [[id number]] Collected: 03/29/24 0336     Updated: 03/29/24 0336    Narrative:        Patient: BRIDGETT SHAH  Time Out: 03:36  Exam(s): XR CXR 1 VIEW     EXAM:    XR Chest, 1 View    CLINICAL HISTORY:     Reason for exam: chest pain.    TECHNIQUE:    Frontal view of the chest.    COMPARISON:    No relevant prior studies available.    FINDINGS:    Lungs:  Unremarkable.  No consolidation.    Pleural space:  Unremarkable.  No pneumothorax.    Heart:  Unremarkable.  No cardiomegaly.    Mediastinum:  Unremarkable.  Normal mediastinal contour.    Bones joints:  Unremarkable.  No acute fracture.    IMPRESSION:         Normal chest x-ray.      Impression:          Electronically signed by Tyrell Singh MD on 03-29-24 at 0336                ECG 12 Lead Chest Pain   Preliminary Result   HEART RATE= 80  bpm   RR Interval= 750  ms   OK Interval= 137  ms   P Horizontal Axis= 18  deg   P Front Axis= 18  deg   QRSD Interval= 87  ms   QT Interval= 376  ms   QTcB= 434  ms   QRS Axis= 13  deg   T Wave Axis= 12  deg   - OTHERWISE NORMAL ECG -   Sinus rhythm   Borderline low voltage, extremity leads   Electronically Signed By:    Date and Time of Study: 2024-03-29 04:42:19      ECG 12 Lead Chest Pain   Preliminary Result   HEART RATE= 79  bpm   RR Interval= 759  ms   OK Interval= 138  ms   P Horizontal Axis= 20  deg   P Front Axis= 28  deg   QRSD Interval= 94  ms   QT Interval= 402  ms   QTcB= 461  ms   QRS Axis= 36  deg   T Wave Axis= 31  deg   - BORDERLINE ECG -   Sinus rhythm    Borderline ST depression, anterolateral leads   Electronically Signed By:    Date and Time of Study: 2024-03-29 01:21:17           Assessment/Plan     Active Hospital Problems    Diagnosis  POA    **Supraventricular tachycardia [I47.10]  Yes    Troponin level elevated [R79.89]  Yes    Demand ischemia [I24.89]  Yes      Resolved Hospital Problems   No resolved problems to display.       Ms. Dahl is a 54 y.o. female who is generally healthy who presents with acute onset of chest pain and palpitations with findings of SVT now status post cardioversion en route to the hospital    No recurrent SVT thus far.  Discussed with Dr. Hoang who is starting atenolol and is going to review with EP.  D-dimer is normal and TSH normal as well, no electrolyte abnormalities.  Troponin is elevated but I suspect this is demand ischemia.  She has no ongoing symptoms.  Echocardiogram ordered, will evaluate cardiac function.  Defer to cardiology if any further testing is needed  Potential dc later today pending cardiology plans    VTE Prophylaxis - SCDs.  Code Status - Full code.       Elmo Sanchez MD  [facility]  03/29/24  13:06 EDT    Electronically signed by Elmo Sanchez MD at 03/29/24 1311          Emergency Department Notes        Dannielle Gay, RN at 03/29/24 0568          .Nursing report ED to floor  Eleni Dahl  54 y.o.  female    HPI :  HPI (Adult)  Stated Reason for Visit: chest pain and abd pain that woke her up out of her sleep. EMS monitors showed vtach  History Obtained From: patient, EMS    Chief Complaint  Chief Complaint   Patient presents with    Chest Pain    Abdominal Pain       Admitting doctor:   Yaniv Desir MD    Admitting diagnosis:   The primary encounter diagnosis was SVT (supraventricular tachycardia). Diagnoses of Chest pain, unspecified type and Elevated troponin were also pertinent to this visit.    Code status:   Current Code Status        Date Active Code Status Order ID Comments User Context       3/29/2024 0533 CPR (Attempt to Resuscitate) [id number]  Pascale Garcias APRN ED        Question Answer    Code Status (Patient has no pulse and is not breathing) CPR (Attempt to Resuscitate)    Medical Interventions (Patient has pulse or is breathing) Full Support                    Allergies:   Wound dressing adhesive    Isolation:   No active isolations    Intake and Output    Intake/Output Summary (Last 24 hours) at 3/29/2024 0545  Last data filed at 3/29/2024 0112  Gross per 24 hour   Intake 500 ml   Output --   Net 500 ml       Weight:       03/29/24  0114   Weight: 72.6 kg (160 lb)       Most recent vitals:   Vitals:    03/29/24 0401 03/29/24 0404 03/29/24 0501 03/29/24 0531   BP: 102/73  114/82 115/75   Pulse:  71 71 72   Resp:  16 16 16   Temp:       TempSrc:       SpO2:  98% 97% 99%   Weight:       Height:           Active LDAs/IV Access:   Lines, Drains & Airways       Active LDAs       Name Placement date Placement time Site Days    Peripheral IV 03/29/24 0112 Left Antecubital 03/29/24  0112  Antecubital  less than 1    External Urinary Catheter 03/29/24  0154  --  less than 1                    Labs (abnormal labs have a star):   Labs Reviewed   COMPREHENSIVE METABOLIC PANEL - Abnormal; Notable for the following components:       Result Value    Glucose 134 (*)     Chloride 112 (*)     CO2 21.0 (*)     Calcium 8.1 (*)     ALT (SGPT) 38 (*)     AST (SGOT) 40 (*)     All other components within normal limits    Narrative:     GFR Normal >60  Chronic Kidney Disease <60  Kidney Failure <15     CBC WITH AUTO DIFFERENTIAL - Abnormal; Notable for the following components:    Lymphocyte % 45.4 (*)     Monocyte % 4.7 (*)     All other components within normal limits   HIGH SENSITIVITIY TROPONIN T 2HR - Abnormal; Notable for the following components:    HS Troponin T 65 (*)     Troponin T Delta 55 (*)     All other components within normal limits     "Narrative:     High Sensitive Troponin T Reference Range:  <14.0 ng/L- Negative Female for AMI  <22.0 ng/L- Negative Male for AMI  >=14 - Abnormal Female indicating possible myocardial injury.  >=22 - Abnormal Male indicating possible myocardial injury.   Clinicians would have to utilize clinical acumen, EKG, Troponin, and serial changes to determine if it is an Acute Myocardial Infarction or myocardial injury due to an underlying chronic condition.        TROPONIN - Normal    Narrative:     High Sensitive Troponin T Reference Range:  <14.0 ng/L- Negative Female for AMI  <22.0 ng/L- Negative Male for AMI  >=14 - Abnormal Female indicating possible myocardial injury.  >=22 - Abnormal Male indicating possible myocardial injury.   Clinicians would have to utilize clinical acumen, EKG, Troponin, and serial changes to determine if it is an Acute Myocardial Infarction or myocardial injury due to an underlying chronic condition.        TSH - Normal   MAGNESIUM - Normal   D-DIMER, QUANTITATIVE - Normal    Narrative:     According to the assay 's published package insert, a normal (<0.50 MCGFEU/mL) D-dimer result in conjunction with a non-high clinical probability assessment, excludes deep vein thrombosis (DVT) and pulmonary embolism (PE) with high sensitivity.    D-dimer values increase with age and this can make VTE exclusion of an older population difficult. To address this, the American College of Physicians, based on best available evidence and recent guidelines, recommends that clinicians use age-adjusted D-dimer thresholds in patients greater than 50 years of age with: a) a low probability of PE who do not meet all Pulmonary Embolism Rule Out Criteria, or b) in those with intermediate probability of PE.   The formula for an age-adjusted D-dimer cut-off is \"age/100\".  For example, a 60 year old patient would have an age-adjusted cut-off of 0.60 MCGFEU/mL and an 80 year old 0.80 MCGFEU/mL.   BASIC METABOLIC " PANEL   CBC (NO DIFF)   TROPONIN   CBC AND DIFFERENTIAL    Narrative:     The following orders were created for panel order CBC & Differential.  Procedure                               Abnormality         Status                     ---------                               -----------         ------                     CBC Auto Differential[[id number]]        Abnormal            Final result                 Please view results for these tests on the individual orders.       EKG:   ECG 12 Lead Chest Pain   Preliminary Result   HEART RATE= 80  bpm   RR Interval= 750  ms   UT Interval= 137  ms   P Horizontal Axis= 18  deg   P Front Axis= 18  deg   QRSD Interval= 87  ms   QT Interval= 376  ms   QTcB= 434  ms   QRS Axis= 13  deg   T Wave Axis= 12  deg   - OTHERWISE NORMAL ECG -   Sinus rhythm   Borderline low voltage, extremity leads   Electronically Signed By:    Date and Time of Study: 2024-03-29 04:42:19      ECG 12 Lead Chest Pain   Preliminary Result   HEART RATE= 79  bpm   RR Interval= 759  ms   UT Interval= 138  ms   P Horizontal Axis= 20  deg   P Front Axis= 28  deg   QRSD Interval= 94  ms   QT Interval= 402  ms   QTcB= 461  ms   QRS Axis= 36  deg   T Wave Axis= 31  deg   - BORDERLINE ECG -   Sinus rhythm   Borderline ST depression, anterolateral leads   Electronically Signed By:    Date and Time of Study: 2024-03-29 01:21:17          Meds given in ED:   Medications   sodium chloride 0.9 % flush 10 mL (has no administration in time range)   sodium chloride 0.9 % flush 10 mL (has no administration in time range)   sodium chloride 0.9 % flush 10 mL (has no administration in time range)   sodium chloride 0.9 % infusion 40 mL (has no administration in time range)   nitroglycerin (NITROSTAT) SL tablet 0.4 mg (has no administration in time range)   acetaminophen (TYLENOL) tablet 650 mg (has no administration in time range)     Or   acetaminophen (TYLENOL) 160 MG/5ML oral solution 650 mg (has no administration in time  range)     Or   acetaminophen (TYLENOL) suppository 650 mg (has no administration in time range)   sennosides-docusate (PERICOLACE) 8.6-50 MG per tablet 2 tablet (has no administration in time range)     And   polyethylene glycol (MIRALAX) packet 17 g (has no administration in time range)     And   bisacodyl (DULCOLAX) EC tablet 5 mg (has no administration in time range)     And   bisacodyl (DULCOLAX) suppository 10 mg (has no administration in time range)   calcium carbonate (TUMS) chewable tablet 500 mg (200 mg elemental) (has no administration in time range)       Imaging results:  XR Chest 1 View    Result Date: 3/29/2024  Electronically signed by Tyrell Singh MD on 03-29-24 at 0336     Ambulatory status:   - up ad queta    Social issues:   Social History     Socioeconomic History    Marital status:        Peripheral Neurovascular  Peripheral Neurovascular (Adult)  Peripheral Neurovascular WDL: WDL    Neuro Cognitive  Neuro Cognitive (Adult)  Cognitive/Neuro/Behavioral WDL: WDL, level of consciousness, orientation  Level of Consciousness: Alert  Orientation: oriented x 4    Learning  Learning Assessment (Adult)  Learning Readiness and Ability: no barriers identified    Respiratory  Respiratory WDL  Respiratory WDL: WDL    Abdominal Pain       Pain Assessments  Pain (Adult)  (0-10) Pain Rating: Rest: 1  (0-10) Pain Rating: Activity: 1    NIH Stroke Scale       Dannielle Gay RN  03/29/24 05:45 EDT      Electronically signed by Dannielle Gay RN at 03/29/24 0521       Mitzi Abreu PA-C at 03/29/24 0403          Date seen: 3/29/2024          Brief HPI:  Pt is a 54 y.o. female who presents for palpitations with chest pain that woke her up.  Describes chest pain as pressure.  Per EMS she was in a tachycardic rhythm, they reported her blood pressure dropped so they synchronized cardioverted her.  Patient appeared to be in SVT en route with EMS.      -See Dr. Pope note for complete HPI,  history, and physical examination.          LAB RESULTS  Recent Results (from the past 24 hour(s))   ECG 12 Lead Chest Pain    Collection Time: 03/29/24  1:21 AM   Result Value Ref Range    QT Interval 402 ms    QTC Interval 461 ms   Comprehensive Metabolic Panel    Collection Time: 03/29/24  1:24 AM    Specimen: Blood   Result Value Ref Range    Glucose 134 (H) 65 - 99 mg/dL    BUN 13 6 - 20 mg/dL    Creatinine 0.90 0.57 - 1.00 mg/dL    Sodium 145 136 - 145 mmol/L    Potassium 3.9 3.5 - 5.2 mmol/L    Chloride 112 (H) 98 - 107 mmol/L    CO2 21.0 (L) 22.0 - 29.0 mmol/L    Calcium 8.1 (L) 8.6 - 10.5 mg/dL    Total Protein 6.2 6.0 - 8.5 g/dL    Albumin 3.9 3.5 - 5.2 g/dL    ALT (SGPT) 38 (H) 1 - 33 U/L    AST (SGOT) 40 (H) 1 - 32 U/L    Alkaline Phosphatase 80 39 - 117 U/L    Total Bilirubin 0.3 0.0 - 1.2 mg/dL    Globulin 2.3 gm/dL    A/G Ratio 1.7 g/dL    BUN/Creatinine Ratio 14.4 7.0 - 25.0    Anion Gap 12.0 5.0 - 15.0 mmol/L    eGFR 76.6 >60.0 mL/min/1.73   High Sensitivity Troponin T    Collection Time: 03/29/24  1:24 AM    Specimen: Blood   Result Value Ref Range    HS Troponin T 10 <14 ng/L   TSH    Collection Time: 03/29/24  1:24 AM    Specimen: Blood   Result Value Ref Range    TSH 2.300 0.270 - 4.200 uIU/mL   Magnesium    Collection Time: 03/29/24  1:24 AM    Specimen: Blood   Result Value Ref Range    Magnesium 1.9 1.6 - 2.6 mg/dL   CBC Auto Differential    Collection Time: 03/29/24  1:24 AM    Specimen: Blood   Result Value Ref Range    WBC 6.39 3.40 - 10.80 10*3/mm3    RBC 4.18 3.77 - 5.28 10*6/mm3    Hemoglobin 13.2 12.0 - 15.9 g/dL    Hematocrit 40.2 34.0 - 46.6 %    MCV 96.2 79.0 - 97.0 fL    MCH 31.6 26.6 - 33.0 pg    MCHC 32.8 31.5 - 35.7 g/dL    RDW 12.6 12.3 - 15.4 %    RDW-SD 44.8 37.0 - 54.0 fl    MPV 10.1 6.0 - 12.0 fL    Platelets 207 140 - 450 10*3/mm3    Neutrophil % 47.0 42.7 - 76.0 %    Lymphocyte % 45.4 (H) 19.6 - 45.3 %    Monocyte % 4.7 (L) 5.0 - 12.0 %    Eosinophil % 2.2 0.3 - 6.2 %     Basophil % 0.5 0.0 - 1.5 %    Immature Grans % 0.2 0.0 - 0.5 %    Neutrophils, Absolute 3.01 1.70 - 7.00 10*3/mm3    Lymphocytes, Absolute 2.90 0.70 - 3.10 10*3/mm3    Monocytes, Absolute 0.30 0.10 - 0.90 10*3/mm3    Eosinophils, Absolute 0.14 0.00 - 0.40 10*3/mm3    Basophils, Absolute 0.03 0.00 - 0.20 10*3/mm3    Immature Grans, Absolute 0.01 0.00 - 0.05 10*3/mm3    nRBC 0.0 0.0 - 0.2 /100 WBC   D-dimer, Quantitative    Collection Time: 03/29/24  1:41 AM    Specimen: Blood   Result Value Ref Range    D-Dimer, Quantitative <0.27 0.00 - 0.53 MCGFEU/mL   High Sensitivity Troponin T 2Hr    Collection Time: 03/29/24  3:20 AM    Specimen: Blood   Result Value Ref Range    HS Troponin T 65 (C) <14 ng/L    Troponin T Delta 55 (C) >=-4 - <+4 ng/L   ECG 12 Lead Chest Pain    Collection Time: 03/29/24  4:42 AM   Result Value Ref Range    QT Interval 376 ms    QTC Interval 434 ms       Ordered the above labs and reviewed the results.          RADIOLOGY  XR Chest 1 View    Result Date: 3/29/2024  Patient: BRIDGETT SHAH  Time Out: 03:36 Exam(s): XR CXR 1 VIEW EXAM:   XR Chest, 1 View CLINICAL HISTORY:    Reason for exam: chest pain. TECHNIQUE:   Frontal view of the chest. COMPARISON:   No relevant prior studies available. FINDINGS:   Lungs:  Unremarkable.  No consolidation.   Pleural space:  Unremarkable.  No pneumothorax.   Heart:  Unremarkable.  No cardiomegaly.   Mediastinum:  Unremarkable.  Normal mediastinal contour.   Bones joints:  Unremarkable.  No acute fracture. IMPRESSION:       Normal chest x-ray.     Electronically signed by Tyrell Singh MD on 03-29-24 at 0336     Ordered the above noted radiological studies. Reviewed by me in PACS.      - My independent interpretation of chest x-ray: No pneumothorax                  Medications given in ED:   Medications   sodium chloride 0.9 % flush 10 mL (has no administration in time range)             Procedures:   Procedures                     Progress Notes/ED Course:   ED  Course as of 03/29/24 0533   Fri Mar 29, 2024   0131 EKG          EKG time: 121  Rhythm/Rate: Normal sinus, rate 79  P waves and ME: Normal P, normal ME  QRS, axis: Narrow QRS, normal axis  ST and T waves: Mild ST depression in the lateral leads    Independently Interpreted by me  No prior EKG available for comparison   [TR]   0145 XR Chest 1 View  My independent interpretation of the imaging study is no dense consolidation [TR]   0350 D-Dimer, Quant: <0.27 [SA]   0443 HS Troponin T(!!): 65 [SA]   0445 EKG  Repeat EKG reviewed, time 0442, heart rate 80 bpm, sinus rhythm, no ST elevation.      Patient rechecked, denies any current chest pain.  Resting comfortably.  Repeat troponin significantly elevated. Repeat EKG stable.  Discussed need for admission for further evaluation and to trend out troponins.  Patient agreeable with plan of care. Pt re-checked. Discussed need for admission, reviewed treatment plan and reason for admission with pt/family and admitting physician.  Pt/family voiced understanding of the plan for admission for further testing/treatment as needed.    [SA]   0530 Consult: I discussed the case with Dr. Ashraf, ANDREY with [facility].  Reviewed patient's history, presentation, exam findings, and ED workup.  They agree with plan of care.  Agrees to admit to observation under Dr. Diaz.   [SA]      ED Course User Index  [SA] Mitzi Abreu PA-C  [TR] Russell Pope MD                     DIAGNOSIS  Final diagnoses:   SVT (supraventricular tachycardia)   Chest pain, unspecified type   Elevated troponin                 Disposition:   ED Disposition       ED Disposition   Decision to Admit    Condition   --    Comment   Level of Care: Telemetry [5]   Diagnosis: Supraventricular tachycardia [206069]   Admitting Physician: SORAYA DIAZ [[id number]]   Bed Request Comments: 5 SOUTH                       Latest Documented Vital Signs:  As of 05:33 EDT  BP- 114/82 HR- 71 Temp- 98 °F (36.7 °C) (Oral) O2 sat-  97%      --        Provider Attestation:   I personally reviewed the past medical history, past surgical history, social history, family history, current medications, and allergies as they appear in the chart.    The patient was seen and examined by myself and Dr. Pope who agrees with plan.      Please note that portions of this were completed with a voice recognition program.     Note Disclaimer: At [facility], we believe that sharing information builds trust and better relationships. You are receiving this note because you are receiving care at [facility] or recently visited. It is possible you will see health information before a provider has talked with you about it. This kind of information can be easy to misunderstand. To help you fully understand what it means for your health, we urge you to discuss this note with your provider.        Provider note signed by:       Mitzi Abreu PA-C  24 0535      Electronically signed by Mitzi Abreu PA-C at 24 0535          Consult Notes (last 48 hours)        David Hoang Jr., MD at 24 0549        Consult Orders    1. Inpatient Cardiology Consult [[id number]] ordered by Pascale Garcisa APRN at 24 0533                 Date of Hospital Visit: 3/29/2024  Encounter Provider: David Hoang MD  Place of Service: [facility] CARDIOLOGY  Patient Name: Eleni Dahl  :1970  Referral Provider: Elmo Sanchez    Chief complaint chest pain     History of Present Illness Eleni Dahl is a 54 year old pt with past medical history of SVT.     She states that she was seen in [facility] about 10 years ago for rapid heart rate requiring IV medication which resolved shortly thereafter.  She states she had pictures of the heart taken and that her cardiologist felt that this was likely a singular event when he saw her in follow-up clinic in the decided on cautious monitoring rather  than medical therapy or further testing.    Pt presented to ER on 3/29/24 with complaints of palpitations and chest pain. She reported waking up in the middle of the night with palpitations and chest pressure. Per EMS , she was in a tachycardic rhythm and her BP dropped. She was cardioverted per EMS.  EKG is only seen in ER MD note which shows narrow complex tachycardia reported as 260 bpm.  In ER, ALT/AST 38/40, troponin T 10,/65, TSH 2.300, D dimer < 0.27, CXR showed nothing acute, EKG showed SR 80 with no ischemic changes.    HPI was reviewed, updated and amended when necessary.            No past medical history on file.    No past surgical history on file.    (Not in a hospital admission)      Current Meds  Scheduled Meds:sodium chloride, 10 mL, Intravenous, Q12H      Continuous Infusions:   PRN Meds:.  acetaminophen **OR** acetaminophen **OR** acetaminophen    senna-docusate sodium **AND** polyethylene glycol **AND** bisacodyl **AND** bisacodyl    calcium carbonate    nitroglycerin    [COMPLETED] Insert Peripheral IV **AND** sodium chloride    sodium chloride    sodium chloride    Allergies as of 03/29/2024 - Reviewed 03/29/2024   Allergen Reaction Noted    Wound dressing adhesive Rash 03/29/2024       Social History     Socioeconomic History    Marital status:      Surgical, medical, social and family history was reviewed, updated and amended when necessary.    Review of Systems   Constitutional: Negative for chills and fever.   HENT:  Negative for hoarse voice and sore throat.    Eyes:  Negative for double vision and photophobia.   Cardiovascular:  Positive for palpitations. Negative for chest pain, leg swelling, near-syncope, orthopnea, paroxysmal nocturnal dyspnea and syncope.   Respiratory:  Negative for cough and wheezing.    Skin:  Negative for poor wound healing and rash.   Musculoskeletal:  Negative for arthritis and joint swelling.   Gastrointestinal:  Negative for bloating, abdominal pain,  "hematemesis and hematochezia.   Neurological:  Negative for dizziness and focal weakness.   Psychiatric/Behavioral:  Negative for depression and suicidal ideas.             Objective:   Temp:  [98 °F (36.7 °C)] 98 °F (36.7 °C)  Heart Rate:  [71-83] 72  Resp:  [16-20] 16  BP: (102-128)/(73-83) 115/75  Body mass index is 25.82 kg/m².  Flowsheet Rows      Flowsheet Row First Filed Value   Admission Height 167.6 cm (66\") Documented at 03/29/2024 0114   Admission Weight 72.6 kg (160 lb) Documented at 03/29/2024 0114          Vitals:    03/29/24 0531   BP: 115/75   Pulse: 72   Resp: 16   Temp:    SpO2: 99%       Vitals reviewed.   Constitutional:       Appearance: Healthy appearance. Not in distress.   Neck:      Vascular: No JVR. JVD normal.   Pulmonary:      Effort: Pulmonary effort is normal.      Breath sounds: Normal breath sounds. No wheezing. No rhonchi. No rales.   Chest:      Chest wall: Not tender to palpatation.   Cardiovascular:      PMI at left midclavicular line. Normal rate. Regular rhythm. Normal S1. Normal S2.       Murmurs: There is no murmur.      No gallop.  No click. No rub.   Pulses:     Intact distal pulses.   Edema:     Peripheral edema absent.   Abdominal:      General: Bowel sounds are normal.      Palpations: Abdomen is soft.      Tenderness: There is no abdominal tenderness.   Musculoskeletal: Normal range of motion.         General: No tenderness. Skin:     General: Skin is warm and dry.   Neurological:      General: No focal deficit present.      Mental Status: Alert and oriented to person, place and time.                 Lab Review:      Results from last 7 days   Lab Units 03/29/24  0124   SODIUM mmol/L 145   POTASSIUM mmol/L 3.9   CHLORIDE mmol/L 112*   CO2 mmol/L 21.0*   BUN mg/dL 13   CREATININE mg/dL 0.90   CALCIUM mg/dL 8.1*   BILIRUBIN mg/dL 0.3   ALK PHOS U/L 80   ALT (SGPT) U/L 38*   AST (SGOT) U/L 40*   GLUCOSE mg/dL 134*     Results from last 7 days   Lab Units 03/29/24  0320 " 03/29/24  0124   HSTROP T ng/L 65* 10     @LABRCNTbnp@  Results from last 7 days   Lab Units 03/29/24  0124   WBC 10*3/mm3 6.39   HEMOGLOBIN g/dL 13.2   HEMATOCRIT % 40.2   PLATELETS 10*3/mm3 207         Results from last 7 days   Lab Units 03/29/24  0124   MAGNESIUM mg/dL 1.9     @LABRCNTIP(chol,trig,hdl,ldl)                  I personally viewed and interpreted the patient's EKG/Telemetry data    Supraventricular tachycardia    Assessment and Plan:    SVT -I am working on getting a large copy of the EKG so that I can review with the EP.  Questionable delta waves in the inferior leads during tachycardia.  There is no evidence for preexcitation on baseline EKG.  I will start the patient on atenolol and check an echocardiogram.  I think it is possible she can go home this afternoon pending conversation with EP.    David Hoang MD  03/29/24  05:49 EDT.  Time spent in reviewing chart, discussion and examination:                   Electronically signed by David Hoang Jr., MD at 03/29/24 0983

## 2024-03-29 NOTE — CONSULTS
Date of Hospital Visit: 3/29/2024  Encounter Provider: David Hoang MD  Place of Service: [facility] CARDIOLOGY  Patient Name: Eleni Dahl  :1970  Referral Provider: Elmo Sanchez    Chief complaint chest pain     History of Present Illness Eleni Dahl is a 54 year old pt with past medical history of SVT.     She states that she was seen in [facility] hospital about 10 years ago for rapid heart rate requiring IV medication which resolved shortly thereafter.  She states she had pictures of the heart taken and that her cardiologist felt that this was likely a singular event when he saw her in follow-up clinic in the decided on cautious monitoring rather than medical therapy or further testing.    Pt presented to ER on 3/29/24 with complaints of palpitations and chest pain. She reported waking up in the middle of the night with palpitations and chest pressure. Per EMS , she was in a tachycardic rhythm and her BP dropped. She was cardioverted per EMS.  EKG is only seen in ER MD note which shows narrow complex tachycardia reported as 260 bpm.  In ER, ALT/AST 38/40, troponin T 10,/65, TSH 2.300, D dimer < 0.27, CXR showed nothing acute, EKG showed SR 80 with no ischemic changes.    HPI was reviewed, updated and amended when necessary.            No past medical history on file.    No past surgical history on file.    (Not in a hospital admission)      Current Meds  Scheduled Meds:sodium chloride, 10 mL, Intravenous, Q12H      Continuous Infusions:   PRN Meds:.  acetaminophen **OR** acetaminophen **OR** acetaminophen    senna-docusate sodium **AND** polyethylene glycol **AND** bisacodyl **AND** bisacodyl    calcium carbonate    nitroglycerin    [COMPLETED] Insert Peripheral IV **AND** sodium chloride    sodium chloride    sodium chloride    Allergies as of 2024 - Reviewed 2024   Allergen Reaction Noted    Wound dressing adhesive Rash 2024       Social  "History     Socioeconomic History    Marital status:      Surgical, medical, social and family history was reviewed, updated and amended when necessary.    Review of Systems   Constitutional: Negative for chills and fever.   HENT:  Negative for hoarse voice and sore throat.    Eyes:  Negative for double vision and photophobia.   Cardiovascular:  Positive for palpitations. Negative for chest pain, leg swelling, near-syncope, orthopnea, paroxysmal nocturnal dyspnea and syncope.   Respiratory:  Negative for cough and wheezing.    Skin:  Negative for poor wound healing and rash.   Musculoskeletal:  Negative for arthritis and joint swelling.   Gastrointestinal:  Negative for bloating, abdominal pain, hematemesis and hematochezia.   Neurological:  Negative for dizziness and focal weakness.   Psychiatric/Behavioral:  Negative for depression and suicidal ideas.             Objective:   Temp:  [98 °F (36.7 °C)] 98 °F (36.7 °C)  Heart Rate:  [71-83] 72  Resp:  [16-20] 16  BP: (102-128)/(73-83) 115/75  Body mass index is 25.82 kg/m².  Flowsheet Rows      Flowsheet Row First Filed Value   Admission Height 167.6 cm (66\") Documented at 03/29/2024 0114   Admission Weight 72.6 kg (160 lb) Documented at 03/29/2024 0114          Vitals:    03/29/24 0531   BP: 115/75   Pulse: 72   Resp: 16   Temp:    SpO2: 99%       Vitals reviewed.   Constitutional:       Appearance: Healthy appearance. Not in distress.   Neck:      Vascular: No JVR. JVD normal.   Pulmonary:      Effort: Pulmonary effort is normal.      Breath sounds: Normal breath sounds. No wheezing. No rhonchi. No rales.   Chest:      Chest wall: Not tender to palpatation.   Cardiovascular:      PMI at left midclavicular line. Normal rate. Regular rhythm. Normal S1. Normal S2.       Murmurs: There is no murmur.      No gallop.  No click. No rub.   Pulses:     Intact distal pulses.   Edema:     Peripheral edema absent.   Abdominal:      General: Bowel sounds are normal.      " Palpations: Abdomen is soft.      Tenderness: There is no abdominal tenderness.   Musculoskeletal: Normal range of motion.         General: No tenderness. Skin:     General: Skin is warm and dry.   Neurological:      General: No focal deficit present.      Mental Status: Alert and oriented to person, place and time.                 Lab Review:      Results from last 7 days   Lab Units 03/29/24  0124   SODIUM mmol/L 145   POTASSIUM mmol/L 3.9   CHLORIDE mmol/L 112*   CO2 mmol/L 21.0*   BUN mg/dL 13   CREATININE mg/dL 0.90   CALCIUM mg/dL 8.1*   BILIRUBIN mg/dL 0.3   ALK PHOS U/L 80   ALT (SGPT) U/L 38*   AST (SGOT) U/L 40*   GLUCOSE mg/dL 134*     Results from last 7 days   Lab Units 03/29/24  0320 03/29/24  0124   HSTROP T ng/L 65* 10     @LABRCNTbnp@  Results from last 7 days   Lab Units 03/29/24  0124   WBC 10*3/mm3 6.39   HEMOGLOBIN g/dL 13.2   HEMATOCRIT % 40.2   PLATELETS 10*3/mm3 207         Results from last 7 days   Lab Units 03/29/24  0124   MAGNESIUM mg/dL 1.9     @LABRCNTIP(chol,trig,hdl,ldl)                  I personally viewed and interpreted the patient's EKG/Telemetry data    Supraventricular tachycardia    Assessment and Plan:    SVT -I am working on getting a large copy of the EKG so that I can review with the EP.  Questionable delta waves in the inferior leads during tachycardia.  There is no evidence for preexcitation on baseline EKG.  I will start the patient on atenolol and check an echocardiogram.  I think it is possible she can go home this afternoon pending conversation with EP.    David Hoang MD  03/29/24  05:49 EDT.  Time spent in reviewing chart, discussion and examination:

## 2024-03-29 NOTE — DISCHARGE SUMMARY
Patient Name: Bridgett Dahl  : 1970  MRN: [id number]    Date of Admission: 3/29/2024  Date of Discharge:  3/29/2024  Primary Care Physician: Provider, No Known      Chief Complaint:   Chest Pain and Abdominal Pain      Discharge Diagnoses     Active Hospital Problems    Diagnosis  POA    **Supraventricular tachycardia [I47.10]  Yes    Troponin level elevated [R79.89]  Yes    Demand ischemia [I24.89]  Yes      Resolved Hospital Problems   No resolved problems to display.        Hospital Course     See H&P dictated earlier today for full details of her presentation and plan.  Echocardiogram was done and was unremarkable.  Cardiology planning to discharge on atenolol and outpatient follow-up.  I discussed case with Dr. Hoang prior to discharge.    Day of Discharge     Subjective:  See H&P for exam    Physical Exam:  Temp:  [98 °F (36.7 °C)-98.6 °F (37 °C)] 98.6 °F (37 °C)  Heart Rate:  [60-83] 64  Resp:  [16-20] 17  BP: (102-128)/(71-83) 103/71  Body mass index is 31.25 kg/m².  Physical Exam    Consultants     Consult Orders (all) (From admission, onward)       Start     Ordered    24 05  Inpatient Cardiology Consult  Once        Specialty:  Cardiology  Provider:  Isaiah Campoverde MD    24  [facility] (on-call MD unless specified) Details  Once        Specialty:  Hospitalist  Provider:  (Not yet assigned)    24                  Procedures       Imaging Results (All)       Procedure Component Value Units Date/Time    XR Chest 1 View [[id number]] Collected: 24     Updated: 24    Narrative:        Patient: BRIDGETT DAHL  Time Out: 03:36  Exam(s): XR CXR 1 VIEW     EXAM:    XR Chest, 1 View    CLINICAL HISTORY:     Reason for exam: chest pain.    TECHNIQUE:    Frontal view of the chest.    COMPARISON:    No relevant prior studies available.    FINDINGS:    Lungs:  Unremarkable.  No consolidation.    Pleural space:  Unremarkable.  No  "pneumothorax.    Heart:  Unremarkable.  No cardiomegaly.    Mediastinum:  Unremarkable.  Normal mediastinal contour.    Bones joints:  Unremarkable.  No acute fracture.    IMPRESSION:         Normal chest x-ray.      Impression:          Electronically signed by Tyrell Singh MD on 03-29-24 at 0336            Results for orders placed during the hospital encounter of 03/29/24    Adult Transthoracic Echo Complete W/ Cont if Necessary Per Protocol    Interpretation Summary    Left ventricular systolic function is normal. Calculated left ventricular EF = 57.3%    Left ventricular diastolic function was normal.    RV normal in size and function.    No significant valvular abnormalities.    Pertinent Labs     Results from last 7 days   Lab Units 03/29/24  0707 03/29/24  0124   WBC 10*3/mm3 6.60 6.39   HEMOGLOBIN g/dL 13.4 13.2   PLATELETS 10*3/mm3 223 207     Results from last 7 days   Lab Units 03/29/24  0707 03/29/24  0124   SODIUM mmol/L 141 145   POTASSIUM mmol/L 3.9 3.9   CHLORIDE mmol/L 107 112*   CO2 mmol/L 22.2 21.0*   BUN mg/dL 12 13   CREATININE mg/dL 0.80 0.90   GLUCOSE mg/dL 91 134*   EGFR mL/min/1.73 88.2 76.6     Results from last 7 days   Lab Units 03/29/24  0124   ALBUMIN g/dL 3.9   BILIRUBIN mg/dL 0.3   ALK PHOS U/L 80   AST (SGOT) U/L 40*   ALT (SGPT) U/L 38*     Results from last 7 days   Lab Units 03/29/24  0707 03/29/24  0124   CALCIUM mg/dL 8.4* 8.1*   ALBUMIN g/dL  --  3.9   MAGNESIUM mg/dL  --  1.9       Results from last 7 days   Lab Units 03/29/24  0707 03/29/24  0320 03/29/24  0141 03/29/24  0124   HSTROP T ng/L 123* 65*  --  10   D DIMER QUANT MCGFEU/mL  --   --  <0.27  --            Invalid input(s): \"LDLCALC\"          Test Results Pending at Discharge       Discharge Details        Discharge Medications        New Medications        Instructions Start Date   atenolol 25 MG tablet  Commonly known as: TENORMIN   25 mg, Oral, Every 24 Hours Scheduled   Start Date: March 30, 2024        " "      Allergies   Allergen Reactions    Wound Dressing Adhesive Rash       Discharge Disposition:  Home or Self Care      Discharge Diet:  Diet Order   Procedures    Diet: Cardiac; Healthy Heart (2-3 Na+); Fluid Consistency: Thin (IDDSI 0)       Discharge Activity:       CODE STATUS:    Code Status and Medical Interventions:   Ordered at: 03/29/24 0533     Code Status (Patient has no pulse and is not breathing):    CPR (Attempt to Resuscitate)     Medical Interventions (Patient has pulse or is breathing):    Full Support       Future Appointments   Date Time Provider Department Center   4/3/2024 11:30 AM David Hoang Jr., MD [facility]     Additional Instructions for the Follow-ups that You Need to Schedule       Discharge Follow-up with Specialty: Cardiology will contact with appointment   As directed      Specialty: Cardiology will contact with appointment               Follow-up Information       [facility] EMERGENCY DEPARTMENT .    Specialty: Emergency Medicine  Why: As needed  Contact information:  [address]  [facility] [address] [phone]             [facility]. Schedule an appointment as soon as possible for a visit on 4/3/2024.    Specialty: Cardiology  Why: Appointment will be Wednesday, April 3, 2024 at 11:30am  Contact information:  [address] [facility] [address]  [facility] [address] [phone]  Additional information:  Phone Number: [phone]   On [facility], tall 6 story \"[address] building.  6th floor.  Elevator opens into office waiting room             Provider, No Known .    Contact information:  [facility] [address] [phone]                             Additional Instructions for the Follow-ups that You Need to Schedule       Discharge Follow-up with Specialty: Cardiology will contact with appointment   As directed      Specialty: Cardiology will contact with appointment "            Time Spent on Discharge: Less than 30 minutes      Elmo Sanchez MD  [facility]  03/29/24  16:38 EDT

## 2024-03-29 NOTE — ED PROVIDER NOTES
EMERGENCY DEPARTMENT ENCOUNTER  Room Number:  S501/1  PCP: Provider, No Known  Independent Historians: Patient and EMS      HPI:  Chief Complaint: had concerns including Chest Pain and Abdominal Pain.     A complete HPI/ROS/PMH/PSH/SH/FH are unobtainable due to: None    Chronic or social conditions impacting patient care (Social Determinants of Health): None      Context: Eleni Dahl is a 54 y.o. female with a medical history of no significant past history who presents to the ED c/o acute palpitations and chest pain.  The patient reports that she woke up in the [place] with palpitations and chest pressure.  She called EMS.  EMS reports she was in a tachycardic rhythm.  They report her blood pressure dropped so they synchronized cardioverted her.  She reports she had a similar episode about 10 years ago and they gave her a shot in her bellybutton which resolved her fast rate.    EMS EKG is below      Review of prior external notes (non-ED) -and- Review of prior external test results outside of this encounter:  Laboratory evaluation 8/30/2023 shows normal CBC    Prescription drug monitoring program review:         PAST MEDICAL HISTORY  Active Ambulatory Problems     Diagnosis Date Noted    No Active Ambulatory Problems     Resolved Ambulatory Problems     Diagnosis Date Noted    No Resolved Ambulatory Problems     No Additional Past Medical History         PAST SURGICAL HISTORY  History reviewed. No pertinent surgical history.      FAMILY HISTORY  History reviewed. No pertinent family history.      SOCIAL HISTORY  Social History     Socioeconomic History    Marital status:    Tobacco Use    Smoking status: Never    Smokeless tobacco: Never   Vaping Use    Vaping status: Never Used   Substance and Sexual Activity    Alcohol use: Never    Drug use: Never    Sexual activity: Never         ALLERGIES  Wound dressing adhesive      REVIEW OF SYSTEMS  Review of Systems  Included in HPI  All systems reviewed and  negative except for those discussed in HPI.      PHYSICAL EXAM    I have reviewed the triage vital signs and nursing notes.    ED Triage Vitals   Temp Pulse Resp BP SpO2   -- -- -- -- --      Temp src Heart Rate Source Patient Position BP Location FiO2 (%)   -- -- -- -- --       Physical Exam  GENERAL: Awake, alert, no acute distress  SKIN: Warm, dry  HENT: Normocephalic, atraumatic  EYES: no scleral icterus  CV: regular rhythm, regular rate  RESPIRATORY: normal effort, lungs clear  ABDOMEN: soft, nontender, nondistended  MUSCULOSKELETAL: no deformity, no calf tenderness or swelling  NEURO: alert, moves all extremities, follows commands            LAB RESULTS  No results found for this or any previous visit (from the past 24 hour(s)).      RADIOLOGY  No Radiology Exams Resulted Within Past 24 Hours      MEDICATIONS GIVEN IN ER  Medications - No data to display        ORDERS PLACED DURING THIS VISIT:  Orders Placed This Encounter   Procedures    XR Chest 1 View    Comprehensive Metabolic Panel    High Sensitivity Troponin T    TSH    Magnesium    CBC Auto Differential    D-dimer, Quantitative    High Sensitivity Troponin T 2Hr    Basic Metabolic Panel    CBC (No Diff)    High Sensitivity Troponin T    Ambulatory Referral to Cardiology    Please draw 2 hour trop  Misc Nursing Order (Specify)    Discharge Follow-up with Specialty: Cardiology will contact with appointment    Inpatient Cardiology Consult    ECG 12 Lead Chest Pain    ECG 12 Lead Chest Pain    Telemetry Scan    Telemetry Scan    Telemetry Scan    Adult Transthoracic Echo Complete W/ Cont if Necessary Per Protocol    Initiate Observation Status    Discharge patient    CBC & Differential         OUTPATIENT MEDICATION MANAGEMENT:  No current Epic-ordered facility-administered medications on file.     Current Outpatient Medications Ordered in Epic   Medication Sig Dispense Refill    atenolol (TENORMIN) 25 MG tablet Take 1 tablet by mouth Daily. 30 tablet 1          PROCEDURES  Procedures            PROGRESS, DATA ANALYSIS, CONSULTS, AND MEDICAL DECISION MAKING  All labs have been independently interpreted by me.  All radiology studies have been reviewed by me. All EKG's have been independently viewed and interpreted by me.  Discussion below represents my analysis of pertinent findings related to patient's condition, differential diagnosis, treatment plan and final disposition.    Differential diagnosis includes but is not limited to acute coronary syndrome, acute aortic syndrome, PE, pneumothorax, unstable angina, atrial arrhythmia, ventricular arrhythmia.    Clinical Scores:                   ED Course as of 03/31/24 1715   Fri Mar 29, 2024   0131 EKG          EKG time: 121  Rhythm/Rate: Normal sinus, rate 79  P waves and TX: Normal P, normal TX  QRS, axis: Narrow QRS, normal axis  ST and T waves: Mild ST depression in the lateral leads    Independently Interpreted by me  No prior EKG available for comparison   [TR]   0145 XR Chest 1 View  My independent interpretation of the imaging study is no dense consolidation [TR]   0350 D-Dimer, Quant: <0.27 [SA]   0443 HS Troponin T(!!): 65 [SA]   0445 Repeat EKG reviewed, time 04 42, heart rate 80 bpm, sinus rhythm, no acute ischemic changes.  Patient rechecked, denies any current chest pain.  Resting comfortably.  Repeat troponin was not elevated.  Discussed need for admission for further evaluation and to trend out troponins.  Patient agreeable with plan of care. Pt re-checked. Discussed need for admission, reviewed treatment plan and reason for admission with pt/family and admitting physician.  Pt/family voiced understanding of the plan for admission for further testing/treatment as needed.    [SA]   2429 Consult: I discussed the case with Dr. Ashraf, ANDREY with [facility].  Reviewed patient's history, presentation, exam findings, and ED workup.  They agree with plan of care.  Agrees to admit to observation under Dr. Desir.    [SA]      ED Course User Index  [SA] Mitzi Abreu PA-C  [TR] Russell Pope MD             AS OF 17:15 EDT VITALS:    BP - 103/71  HR - 64  TEMP - 98.6 °F (37 °C) (Oral)  O2 SATS - 98%    COMPLEXITY OF CARE  The patient requires admission.      DIAGNOSIS  Final diagnoses:   SVT (supraventricular tachycardia)   Chest pain, unspecified type   Elevated troponin         DISPOSITION  ED Disposition       ED Disposition   Decision to Admit    Condition   --    Comment   Level of Care: Telemetry [5]   Diagnosis: Supraventricular tachycardia [313064]   Admitting Physician: SORAYA DIAZ [[id number]]   Bed Request Comments: 5 SOUTH                  Please note that portions of this document were completed with a voice recognition program.    Note Disclaimer: At [facility], we believe that sharing information builds trust and better relationships. You are receiving this note because you recently visited [facility]. It is possible you will see health information before a provider has talked with you about it. This kind of information can be easy to misunderstand. To help you fully understand what it means for your health, we urge you to discuss this note with your provider.         Russell Pope MD  03/31/24 [id number]

## 2024-03-30 NOTE — CASE MANAGEMENT/SOCIAL WORK
Case Management Discharge Note      Final Note: Home         Selected Continued Care - Discharged on 3/29/2024 Admission date: 3/29/2024 - Discharge disposition: Home or Self Care      Destination    No services have been selected for the patient.                Durable Medical Equipment    No services have been selected for the patient.                Dialysis/Infusion    No services have been selected for the patient.                Home Medical Care    No services have been selected for the patient.                Therapy    No services have been selected for the patient.                Community Resources    No services have been selected for the patient.                Community & DME    No services have been selected for the patient.                         Final Discharge Disposition Code: 01 - home or self-care